# Patient Record
Sex: FEMALE | Employment: OTHER | URBAN - METROPOLITAN AREA
[De-identification: names, ages, dates, MRNs, and addresses within clinical notes are randomized per-mention and may not be internally consistent; named-entity substitution may affect disease eponyms.]

---

## 2018-10-21 ENCOUNTER — OFFICE VISIT (OUTPATIENT)
Dept: FAMILY MEDICINE CLINIC | Facility: CLINIC | Age: 73
End: 2018-10-21
Payer: COMMERCIAL

## 2018-10-21 DIAGNOSIS — H61.22 IMPACTED CERUMEN OF LEFT EAR: Primary | ICD-10-CM

## 2018-10-21 PROCEDURE — 69209 REMOVE IMPACTED EAR WAX UNI: CPT | Performed by: NURSE PRACTITIONER

## 2018-10-21 NOTE — PROGRESS NOTES
Cerumen Removal Procedure  Patient gave verbal consent. Risks and Benefits of removal were discussed with the patient, who agreed to proceed with procedure. Left Ear  Indication TM not visible, local itching, fullness.   Prep Hydrogen Peroxide  Otoscope v

## 2023-08-04 ENCOUNTER — HOSPITAL ENCOUNTER (OUTPATIENT)
Age: 78
Discharge: HOME | End: 2023-08-04
Payer: MEDICARE

## 2023-08-04 ENCOUNTER — APPOINTMENT (OUTPATIENT)
Dept: GENERAL RADIOLOGY | Age: 78
End: 2023-08-04
Attending: EMERGENCY MEDICINE
Payer: MEDICARE

## 2023-08-04 VITALS
SYSTOLIC BLOOD PRESSURE: 106 MMHG | DIASTOLIC BLOOD PRESSURE: 50 MMHG | HEART RATE: 88 BPM | OXYGEN SATURATION: 99 % | RESPIRATION RATE: 20 BRPM | TEMPERATURE: 98 F
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
## 2023-08-04 DIAGNOSIS — M62.830 MUSCLE SPASM OF BACK: Primary | ICD-10-CM

## 2023-08-04 PROBLEM — E55.9 VITAMIN D DEFICIENCY DISEASE: Status: ACTIVE | Noted: 2017-10-19

## 2023-08-04 PROBLEM — R05.3 CHRONIC COUGH: Status: ACTIVE | Noted: 2017-08-01

## 2023-08-04 PROBLEM — F41.1 ANXIETY STATE: Status: ACTIVE | Noted: 2018-10-19

## 2023-08-04 PROBLEM — H25.10 AGE-RELATED NUCLEAR CATARACT: Status: ACTIVE | Noted: 2017-06-16

## 2023-08-04 PROBLEM — E78.5 HYPERLIPIDEMIA: Status: ACTIVE | Noted: 2023-08-04

## 2023-08-04 PROBLEM — N60.99 ATYPICAL DUCTAL HYPERPLASIA, BREAST: Status: ACTIVE | Noted: 2022-01-21

## 2023-08-04 PROCEDURE — 72100 X-RAY EXAM L-S SPINE 2/3 VWS: CPT | Performed by: EMERGENCY MEDICINE

## 2023-08-04 PROCEDURE — 99203 OFFICE O/P NEW LOW 30 MIN: CPT | Performed by: EMERGENCY MEDICINE

## 2023-08-04 RX ORDER — METHOCARBAMOL 500 MG/1
500 TABLET, FILM COATED ORAL 3 TIMES DAILY PRN
Qty: 14 TABLET | Refills: 0 | Status: SHIPPED | OUTPATIENT
Start: 2023-08-04 | End: 2023-08-07

## 2023-08-04 RX ORDER — PREDNISONE 20 MG/1
TABLET ORAL
Qty: 8 TABLET | Refills: 0 | Status: SHIPPED | OUTPATIENT
Start: 2023-08-04 | End: 2023-08-09

## 2023-08-04 NOTE — ED INITIAL ASSESSMENT (HPI)
Pt presents with mechanical fall one week ago. Pt reports walking into a wall in the middle of the night, fell backwards onto buttocks/lower back, onto a piece of wooden trim at the bottom of the bed. Pain radiates down legs, no numbness or tingling in legs. No head injury, no LOC. Pt took Advil early am today.

## 2023-08-04 NOTE — DISCHARGE INSTRUCTIONS
There is no acute fracture/break on the x-ray. Always in the day with a cool compress/ice pack. Prednisone steroid sent to the pharmacy to help out with inflammation. Depending on when you get the medication you may want to start tomorrow morning. It is only once daily. For the first 3 days you will take 40 mg, and the last 2 days you will take 20 mg. Robaxin muscle relaxer was sent to the pharmacy. Start out with 1 tablet. He can increase to 2 tablets every 8 hours as needed for muscle spasm/pain. Do not exceed 6 tablets in 24 hours. Strict fall precautions while being on this medication. Make sure that you are changing positions slowly, and use the bathroom before you go to bed. Call your primary care for follow-up if you are not better within the next week. You may need physical therapy. Go to the ER for any new or worsening symptoms. Avoid taking Advil/Motrin/ibuprofen with prednisone. Wait at least 4 to 6 hours in between. It can cause GI upset and ulcers over time.

## 2023-08-07 RX ORDER — METHOCARBAMOL 500 MG/1
500 TABLET, FILM COATED ORAL 3 TIMES DAILY
Qty: 15 TABLET | Refills: 0 | Status: SHIPPED | OUTPATIENT
Start: 2023-08-07 | End: 2023-08-12

## 2024-09-21 ENCOUNTER — HOSPITAL ENCOUNTER (EMERGENCY)
Facility: HOSPITAL | Age: 79
Discharge: HOME | End: 2024-09-21
Attending: EMERGENCY MEDICINE
Payer: MEDICARE

## 2024-09-21 ENCOUNTER — APPOINTMENT (OUTPATIENT)
Dept: CT IMAGING | Facility: HOSPITAL | Age: 79
End: 2024-09-21
Payer: MEDICARE

## 2024-09-21 VITALS
BODY MASS INDEX: 21.34 KG/M2 | WEIGHT: 125 LBS | RESPIRATION RATE: 17 BRPM | HEART RATE: 62 BPM | HEIGHT: 64 IN | SYSTOLIC BLOOD PRESSURE: 142 MMHG | TEMPERATURE: 98 F | DIASTOLIC BLOOD PRESSURE: 86 MMHG | OXYGEN SATURATION: 99 %

## 2024-09-21 DIAGNOSIS — R55 SYNCOPE AND COLLAPSE: Primary | ICD-10-CM

## 2024-09-21 DIAGNOSIS — S09.90XA INJURY OF HEAD, INITIAL ENCOUNTER: ICD-10-CM

## 2024-09-21 LAB
ALBUMIN SERPL-MCNC: 4.8 G/DL (ref 3.2–4.8)
ALBUMIN/GLOB SERPL: 1.9 {RATIO} (ref 1–2)
ALP LIVER SERPL-CCNC: 61 U/L
ALT SERPL-CCNC: 18 U/L
ANION GAP SERPL CALC-SCNC: 7 MMOL/L (ref 0–18)
AST SERPL-CCNC: 30 U/L (ref ?–34)
BASOPHILS # BLD AUTO: 0.03 X10(3) UL (ref 0–0.2)
BASOPHILS NFR BLD AUTO: 0.4 %
BILIRUB SERPL-MCNC: 0.5 MG/DL (ref 0.2–1.1)
BUN BLD-MCNC: 17 MG/DL (ref 9–23)
BUN/CREAT SERPL: 26.6 (ref 10–20)
CALCIUM BLD-MCNC: 9.5 MG/DL (ref 8.7–10.4)
CHLORIDE SERPL-SCNC: 103 MMOL/L (ref 98–112)
CO2 SERPL-SCNC: 27 MMOL/L (ref 21–32)
CREAT BLD-MCNC: 0.64 MG/DL
DEPRECATED RDW RBC AUTO: 41.4 FL (ref 35.1–46.3)
EGFRCR SERPLBLD CKD-EPI 2021: 90 ML/MIN/1.73M2 (ref 60–?)
EOSINOPHIL # BLD AUTO: 0.11 X10(3) UL (ref 0–0.7)
EOSINOPHIL NFR BLD AUTO: 1.5 %
ERYTHROCYTE [DISTWIDTH] IN BLOOD BY AUTOMATED COUNT: 13.5 % (ref 11–15)
GLOBULIN PLAS-MCNC: 2.5 G/DL (ref 2–3.5)
GLUCOSE BLD-MCNC: 112 MG/DL (ref 70–99)
HCT VFR BLD AUTO: 36 %
HGB BLD-MCNC: 12.4 G/DL
IMM GRANULOCYTES # BLD AUTO: 0.02 X10(3) UL (ref 0–1)
IMM GRANULOCYTES NFR BLD: 0.3 %
LYMPHOCYTES # BLD AUTO: 0.98 X10(3) UL (ref 1–4)
LYMPHOCYTES NFR BLD AUTO: 13.1 %
MCH RBC QN AUTO: 29 PG (ref 26–34)
MCHC RBC AUTO-ENTMCNC: 34.4 G/DL (ref 31–37)
MCV RBC AUTO: 84.1 FL
MONOCYTES # BLD AUTO: 0.66 X10(3) UL (ref 0.1–1)
MONOCYTES NFR BLD AUTO: 8.8 %
NEUTROPHILS # BLD AUTO: 5.68 X10 (3) UL (ref 1.5–7.7)
NEUTROPHILS # BLD AUTO: 5.68 X10(3) UL (ref 1.5–7.7)
NEUTROPHILS NFR BLD AUTO: 75.9 %
OSMOLALITY SERPL CALC.SUM OF ELEC: 286 MOSM/KG (ref 275–295)
PLATELET # BLD AUTO: 337 10(3)UL (ref 150–450)
POTASSIUM SERPL-SCNC: 4.1 MMOL/L (ref 3.5–5.1)
PROT SERPL-MCNC: 7.3 G/DL (ref 5.7–8.2)
RBC # BLD AUTO: 4.28 X10(6)UL
SODIUM SERPL-SCNC: 137 MMOL/L (ref 136–145)
TROPONIN I SERPL HS-MCNC: 4 NG/L
WBC # BLD AUTO: 7.5 X10(3) UL (ref 4–11)

## 2024-09-21 PROCEDURE — 70450 CT HEAD/BRAIN W/O DYE: CPT | Performed by: EMERGENCY MEDICINE

## 2024-09-21 PROCEDURE — 85025 COMPLETE CBC W/AUTO DIFF WBC: CPT

## 2024-09-21 PROCEDURE — 36415 COLL VENOUS BLD VENIPUNCTURE: CPT

## 2024-09-21 PROCEDURE — 99285 EMERGENCY DEPT VISIT HI MDM: CPT

## 2024-09-21 PROCEDURE — 85025 COMPLETE CBC W/AUTO DIFF WBC: CPT | Performed by: EMERGENCY MEDICINE

## 2024-09-21 PROCEDURE — 93005 ELECTROCARDIOGRAM TRACING: CPT

## 2024-09-21 PROCEDURE — 99284 EMERGENCY DEPT VISIT MOD MDM: CPT

## 2024-09-21 PROCEDURE — 84484 ASSAY OF TROPONIN QUANT: CPT

## 2024-09-21 PROCEDURE — 84484 ASSAY OF TROPONIN QUANT: CPT | Performed by: EMERGENCY MEDICINE

## 2024-09-21 PROCEDURE — 80053 COMPREHEN METABOLIC PANEL: CPT | Performed by: EMERGENCY MEDICINE

## 2024-09-21 PROCEDURE — 93010 ELECTROCARDIOGRAM REPORT: CPT

## 2024-09-21 PROCEDURE — 80053 COMPREHEN METABOLIC PANEL: CPT

## 2024-09-21 NOTE — ED INITIAL ASSESSMENT (HPI)
Pt presents she was standing in the kitchen and fell. Pt reports \"I might have passed out. I'm not really sure.\" Pt reports falling backwards and hit head on ceramic tile floor. +LOC. Pt denies blood thinner use.

## 2024-09-21 NOTE — ED QUICK NOTES
Pt to ED with daughter. PT reporting at approx 1330, \"blacked out\" for unknown amount of time in kitchen, unwitnessed fall, does not know if she felt dizzy before falling, + hit head, no anticoagulants.

## 2024-09-22 LAB
ATRIAL RATE: 67 BPM
P AXIS: 89 DEGREES
P-R INTERVAL: 144 MS
Q-T INTERVAL: 384 MS
QRS DURATION: 80 MS
QTC CALCULATION (BEZET): 405 MS
R AXIS: 75 DEGREES
T AXIS: 49 DEGREES
VENTRICULAR RATE: 67 BPM

## 2024-09-22 NOTE — ED PROVIDER NOTES
Patient Seen in: [facility] Emergency Department    History     Chief Complaint   Patient presents with    Trauma       HPI    The patient presents to the ED after passing out in her kitchen and waking up on the floor.  She called her  after she woke up to find her on the floor.  She states that she hit the back of her head on the floor and has pain in this area.  Denies palpitations or chest pain prior to passing out.  She denies history of passing out.  Denies any anticoagulant use.  Slightly confused but otherwise well-appearing per daughter who arrived to minutes after the incident.    History reviewed.   Past Medical History:    Hyperlipidemia       History reviewed. History reviewed. No pertinent surgical history.      Medications :  (Not in a hospital admission)       No family history on file.    Smoking Status:   Social History     Socioeconomic History    Marital status:    Tobacco Use    Smoking status: Never    Smokeless tobacco: Never   Vaping Use    Vaping status: Never Used   Substance and Sexual Activity    Alcohol use: Yes    Drug use: Not Currently       Constitutional and vital signs reviewed.      Social History and Family History elements reviewed from today, pertinent positives to the presenting problem noted.    Physical Exam     ED Triage Vitals [09/21/24 1540]   /74   Pulse 67   Resp 19   Temp 98.3 °F (36.8 °C)   Temp src Oral   SpO2 98 %   O2 Device None (Room air)       All measures to prevent infection transmission during my interaction with the patient were taken. Handwashing was performed prior to and after the exam.  Stethoscope and any equipment used during my examination was cleaned with super sani-cloth germicidal wipes following the exam.     Physical Exam  Vitals and nursing note reviewed.   Constitutional:       General: She is not in acute distress.     Appearance: Normal appearance. She is well-developed.   HENT:      Head: Normocephalic.       Comments: Contusion of the left occipital scalp  Eyes:      General:         Right eye: No discharge.         Left eye: No discharge.      Conjunctiva/sclera: Conjunctivae normal.   Neck:      Trachea: No tracheal deviation.   Cardiovascular:      Rate and Rhythm: Normal rate.   Pulmonary:      Effort: Pulmonary effort is normal. No respiratory distress.   Abdominal:      General: There is no distension.      Palpations: Abdomen is soft.   Musculoskeletal:         General: No deformity.      Cervical back: Neck supple. No tenderness.   Skin:     General: Skin is warm and dry.   Neurological:      General: No focal deficit present.      Mental Status: She is alert and oriented to person, place, and time.   Psychiatric:         Mood and Affect: Mood normal.         Behavior: Behavior normal.         ED Course        Labs Reviewed   COMP METABOLIC PANEL (14) - Abnormal; Notable for the following components:       Result Value    Glucose 112 (*)     BUN/CREA Ratio 26.6 (*)     All other components within normal limits   CBC WITH DIFFERENTIAL WITH PLATELET - Abnormal; Notable for the following components:    Lymphocyte Absolute 0.98 (*)     All other components within normal limits   TROPONIN I HIGH SENSITIVITY - Normal   RAINBOW DRAW LAVENDER   RAINBOW DRAW LIGHT GREEN   RAINBOW DRAW BLUE     EKG    Rate, intervals and axes as noted on EKG Report.  Rate: Normal, 67 bpm  Rhythm: Sinus Rhythm  Reading: Normal intervals, nonspecific ST and T wave abnormality, abnormal EKG           As Interpreted by me    Imaging Results Available and Reviewed while in ED: CT BRAIN OR HEAD (CPT=70450)    Result Date: 9/21/2024  CONCLUSION:  1. No acute intracranial process by noncontrast CT technique. 2. Chronic-appearing lacunar infarcts in the right greater than left basal nuclei. 3. Mild scattered intracranial atherosclerosis. 4. Lesser incidental findings as above.   Dictated by (CST): Sathya Ramirez MD on 9/21/2024 at 4:53 PM      Finalized by (CST): Sathya Ramirez MD on 9/21/2024 at 4:55 PM         ED Medications Administered: Medications - No data to display      MDM     Vitals:    09/21/24 1615 09/21/24 1645 09/21/24 1700 09/21/24 1715   BP: 158/90 152/71 152/84 142/86   Pulse: 80 66 64 62   Resp: 19 19 12 17   Temp:       TempSrc:       SpO2: 96% 98% 98% 99%   Weight:       Height:         *I personally reviewed and interpreted all ED vitals.    Pulse Ox: 99%, Room air, Normal     Monitor Interpretation:   normal sinus rhythm as interpreted by me.  The cardiac monitor was ordered to monitor heart rate and monitor for arrhythmia.    Differential Diagnosis/ Diagnostic Considerations: Vasovagal syncope, head injury, ICH, skull fracture, seizure, other    Complicating Factors: The patient already has does not have any pertinent problems on file. to contribute to the complexity of this ED evaluation.    Medical Decision Making  The patient presents to the ED after a likely syncopal event in her house.  She reports losing consciousness in the kitchen.  Awake and talking per daughter who arrived 10 minutes after the incident.  Mild confusion at that time but patient well-appearing in the ED without focal neurodeficits or other symptoms.  She is alert oriented nondistressed.  Isolated pain in her head.  CT brain without intracranial injury.  No cardiac arrhythmia on monitor and normal cardiac testing.  Patient without concerning cause for her likely syncopal event.  Low risk based on Perry syncope rule.  Patient would like to go home and I feel stable to do so given outpatient follow-up.    Problems Addressed:  Injury of head, initial encounter: acute illness or injury that poses a threat to life or bodily functions  Syncope and collapse: acute illness or injury that poses a threat to life or bodily functions    Amount and/or Complexity of Data Reviewed  Labs: ordered.  Radiology: ordered and independent interpretation performed.  Decision-making details documented in ED Course.     Details: I personally reviewed the patient's CT brain images and noted no ICH  ECG/medicine tests: ordered and independent interpretation performed. Decision-making details documented in ED Course.        Condition upon leaving the department: Stable    Disposition and Plan     Clinical Impression:  1. Syncope and collapse    2. Injury of head, initial encounter        Disposition:  Discharge    Follow-up:  Jacquie Ryan MD  [facility] [address] [facility] [address]  [phone]    Schedule an appointment as soon as possible for a visit in 3 day(s)        Medications Prescribed:  There are no discharge medications for this patient.

## 2024-12-10 ENCOUNTER — APPOINTMENT (OUTPATIENT)
Dept: GENERAL RADIOLOGY | Facility: HOSPITAL | Age: 79
End: 2024-12-10
Payer: MEDICARE

## 2024-12-10 ENCOUNTER — HOSPITAL ENCOUNTER (OUTPATIENT)
Age: 79
Discharge: ANOTHER HOSPITAL | End: 2024-12-10
Payer: MEDICARE

## 2024-12-10 ENCOUNTER — HOSPITAL ENCOUNTER (EMERGENCY)
Facility: HOSPITAL | Age: 79
Discharge: HOME | End: 2024-12-10
Attending: EMERGENCY MEDICINE
Payer: MEDICARE

## 2024-12-10 VITALS
OXYGEN SATURATION: 98 % | WEIGHT: 124 LBS | TEMPERATURE: 98 F | HEART RATE: 73 BPM | RESPIRATION RATE: 18 BRPM | HEIGHT: 64 IN | BODY MASS INDEX: 21.17 KG/M2 | DIASTOLIC BLOOD PRESSURE: 65 MMHG | SYSTOLIC BLOOD PRESSURE: 136 MMHG

## 2024-12-10 VITALS
SYSTOLIC BLOOD PRESSURE: 145 MMHG | HEART RATE: 80 BPM | DIASTOLIC BLOOD PRESSURE: 57 MMHG | OXYGEN SATURATION: 97 % | TEMPERATURE: 99 F | RESPIRATION RATE: 20 BRPM

## 2024-12-10 DIAGNOSIS — S09.90XA INJURY OF HEAD, INITIAL ENCOUNTER: Primary | ICD-10-CM

## 2024-12-10 DIAGNOSIS — S82.025A CLOSED NONDISPLACED LONGITUDINAL FRACTURE OF LEFT PATELLA, INITIAL ENCOUNTER: Primary | ICD-10-CM

## 2024-12-10 DIAGNOSIS — S89.92XA INJURY OF LEFT KNEE, INITIAL ENCOUNTER: ICD-10-CM

## 2024-12-10 PROCEDURE — 73560 X-RAY EXAM OF KNEE 1 OR 2: CPT | Performed by: EMERGENCY MEDICINE

## 2024-12-10 PROCEDURE — 99215 OFFICE O/P EST HI 40 MIN: CPT | Performed by: NURSE PRACTITIONER

## 2024-12-10 PROCEDURE — 99284 EMERGENCY DEPT VISIT MOD MDM: CPT

## 2024-12-10 RX ORDER — ASPIRIN 81 MG/1
81 TABLET ORAL DAILY
COMMUNITY

## 2024-12-10 RX ORDER — HYDROCODONE BITARTRATE AND ACETAMINOPHEN 5; 325 MG/1; MG/1
1 TABLET ORAL EVERY 6 HOURS PRN
Qty: 20 TABLET | Refills: 0 | Status: SHIPPED | OUTPATIENT
Start: 2024-12-10

## 2024-12-10 RX ORDER — ALENDRONATE SODIUM 70 MG/1
70 TABLET ORAL WEEKLY
COMMUNITY

## 2024-12-10 RX ORDER — ESCITALOPRAM OXALATE 20 MG/1
0.5 TABLET ORAL 2 TIMES DAILY
COMMUNITY
Start: 2024-10-15

## 2024-12-10 RX ORDER — ATORVASTATIN CALCIUM 80 MG/1
80 TABLET, FILM COATED ORAL DAILY
COMMUNITY
Start: 2024-09-06

## 2024-12-11 NOTE — ED QUICK NOTES
Patient safe to DC home per MD. Able to dress self. DC teaching done, instructions reviewed with patient and family,  including when and how to follow up with healthcare providers and when to seek emergency care. The patient verbalizes understanding. Patient wheeled to exit.

## 2024-12-11 NOTE — ED PROVIDER NOTES
Patient Seen in: [facility]      History     Chief Complaint   Patient presents with    Leg or Foot Injury     Fall knees - Entered by patient     Stated Complaint: Leg or Foot Injury - Fall knees    Subjective:   79-year-old female with hyperlipidemia, vitamin D deficiency and anxiety presents with daughter and sister with complaints of falling at around 2 PM today.  Patient communicates that she was walking through a parking lot and slipped, causing her to fall forward.  Patient communicates that now she is unable to walk due to left knee pain.  Patient also communicates hitting the right side of her head on a curb when falling.  Patient denies loss of consciousness, nausea or vomiting.  Patient communicates that she braced her fall with her hands, and is now feeling some discomfort in her upper chest with upper arm range of motion.                 Objective:     Past Medical History:    Hyperlipidemia              History reviewed. No pertinent surgical history.             Social History     Socioeconomic History    Marital status:    Tobacco Use    Smoking status: Never    Smokeless tobacco: Never   Vaping Use    Vaping status: Never Used   Substance and Sexual Activity    Alcohol use: Yes    Drug use: Not Currently     Social Drivers of Health     Financial Resource Strain: Low Risk  (9/19/2024)    Received from [facility]    Overall Financial Resource Strain (CARDIA)     Difficulty of Paying Living Expenses: Not hard at all   Food Insecurity: Low Risk  (11/12/2024)    Received from [facility]    Food Insecurity     Have there been times that your food ran out, and you didn't have money to get more?: No     Are there times that you worry that this might happen?: No   Transportation Needs: Low Risk  (11/12/2024)    Received from [facility]    Transportation Needs     Do you have trouble getting transportation to medical  appointments?: No    [facility]    Social Connections   Housing Stability: Low Risk  (11/12/2024)    Received from [facility]    Housing Stability     Are you worried that your electric, gas, oil, or water might be shut off?: No     Are you concerned about having a safe and reliable place to live?: No              Review of Systems    Positive for stated complaint: Leg or Foot Injury - Fall knees  Other systems are as noted in HPI.  Constitutional and vital signs reviewed.      All other systems reviewed and negative except as noted above.    Physical Exam     ED Triage Vitals [12/10/24 1758]   /57   Pulse 80   Resp 20   Temp 98.7 °F (37.1 °C)   Temp src Oral   SpO2 97 %   O2 Device None (Room air)       Current Vitals:   Vital Signs  BP: 145/57  Pulse: 80  Resp: 20  Temp: 98.7 °F (37.1 °C)  Temp src: Oral    Oxygen Therapy  SpO2: 97 %  O2 Device: None (Room air)        Physical Exam  VS: Vital signs reviewed. 02 saturation within normal limits for this patient.    General: Patient is awake and alert, oriented to person, place and time. Pt appears non-toxic.     HEENT: Head is normocephalic. Pupils reactive bilaterally. Nonicteric sclera, no conjunctival injection. No facial droop or slurred speech. No oral lesions or pallor. Mucous membranes moist.      Right Ear: No hemotympanum.      Left Ear: No hemotympanum.      Nose: No rhinorrhea.     Ecchymosis present to right aspect of head.  Tender to palpation.  No bony step-off.    Neck: Supple. Normal ROM.    Lungs: Good inspiratory effort.  No accessory muscle use or tachypnea.    Back: Normal inspection, no tenderness.     Extremities: Capillary refill noted.     Skin: Warm, dry and normal in color.     Psychiatric: Normal affect, judgement normal, insight normal.     CNS: Moves all 4 extremities. Interacts appropriately. No gait abnormality. Memory normal.        ED Course    Labs Reviewed - No data to display    MDM   Medical Decision Making  Patient communicates not feeling well since fall.    I discussed with patient and patient's family the need for further evaluation/management in the emergency department at this time due to limited diagnostics available at this facility.    Patient's daughter communicates that they will be taking patient to [facility] ER.    Problems Addressed:  Injury of head, initial encounter: acute illness or injury  Injury of left knee, initial encounter: acute illness or injury        Disposition and Plan     Clinical Impression:  1. Injury of head, initial encounter    2. Injury of left knee, initial encounter         Disposition:  Ic to ed  12/10/2024  6:33 pm    Follow-up:  No follow-up provider specified.        Medications Prescribed:  Discharge Medication List as of 12/10/2024  6:34 PM              Supplementary Documentation:

## 2024-12-11 NOTE — ED INITIAL ASSESSMENT (HPI)
Pt states today at 2pm was walking through the parking lot and slipped causing her to fall forward pt states left knee took brunt of damage, braced her fall with her hand but also hit right side of head. Pt denies LOC.

## 2024-12-11 NOTE — ED INITIAL ASSESSMENT (HPI)
Pt presents to ed with c/o fall. Pt states she tripped in a parking lot and hit her head on the cement. Pt reports left knee pain, and is unable to bear weight on the left side. Pt aox4, speaking in full sentences.     No LOC. + thinners

## 2024-12-11 NOTE — ED PROVIDER NOTES
Patient Seen in: [facility] Emergency Department    History     Chief Complaint   Patient presents with    Fall       HPI    Patient presents to the ED after a fall.  She states that she tripped in a parking lot and fell and hit her left knee and also slightly hit her head.  Takes aspirin but no other anticoagulants.  Denies headache at this time and no vomiting or LOC.  She complains of severe pain in her left knee and cannot bear weight.    History reviewed.   Past Medical History:    Hyperlipidemia       History reviewed. History reviewed. No pertinent surgical history.      Medications :  Prescriptions Prior to Admission[1]     No family history on file.    Smoking Status:   Social History     Socioeconomic History    Marital status:    Tobacco Use    Smoking status: Never    Smokeless tobacco: Never   Vaping Use    Vaping status: Never Used   Substance and Sexual Activity    Alcohol use: Yes    Drug use: Not Currently       Constitutional and vital signs reviewed.      Social History and Family History elements reviewed from today, pertinent positives to the presenting problem noted.    Physical Exam     ED Triage Vitals [12/10/24 1904]   /65   Pulse 73   Resp 18   Temp 97.9 °F (36.6 °C)   Temp src Temporal   SpO2 98 %   O2 Device None (Room air)       All measures to prevent infection transmission during my interaction with the patient were taken. Handwashing was performed prior to and after the exam.  Stethoscope and any equipment used during my examination was cleaned with super sani-cloth germicidal wipes following the exam.     Physical Exam  Vitals and nursing note reviewed.   Constitutional:       General: She is not in acute distress.     Appearance: She is well-developed.   HENT:      Head: Normocephalic.      Comments: Small contusion to the right lateral eye.  Eyes:      General:         Right eye: No discharge.         Left eye: No discharge.      Conjunctiva/sclera:  Conjunctivae normal.   Neck:      Trachea: No tracheal deviation.   Cardiovascular:      Rate and Rhythm: Normal rate.   Pulmonary:      Effort: Pulmonary effort is normal. No respiratory distress.      Breath sounds: No stridor.   Abdominal:      General: There is no distension.      Palpations: Abdomen is soft.   Musculoskeletal:         General: Swelling and tenderness present. No deformity.      Cervical back: No tenderness.      Comments: Tenderness and swelling to the left anterior knee.  No obvious bony deformity.   Skin:     General: Skin is warm and dry.   Neurological:      Mental Status: She is alert and oriented to person, place, and time.   Psychiatric:         Mood and Affect: Mood normal.         Behavior: Behavior normal.         ED Course      Labs Reviewed - No data to display    As Interpreted by me    Imaging Results Available and Reviewed while in ED: XR KNEE (1 OR 2 VIEWS), LEFT (CPT=73560)    Result Date: 12/10/2024  CONCLUSION:  1. Nondisplaced fractures of the left patella without destruction of the superior and inferior patellar poles.  2. Suspected large left lipohemarthrosis.    Dictated by (CST): Mumtaz Lockett MD on 12/10/2024 at 8:21 PM     Finalized by (CST): Mumtaz Lockett MD on 12/10/2024 at 8:22 PM         ED Medications Administered: Medications - No data to display      MDM     Vitals:    12/10/24 1904   BP: 136/65   Pulse: 73   Resp: 18   Temp: 97.9 °F (36.6 °C)   TempSrc: Temporal   SpO2: 98%   Weight: 56.2 kg   Height: 162.6 cm (5' 4\")     *I personally reviewed and interpreted all ED vitals.    Pulse Ox: 98%, Room air, Normal     Differential Diagnosis/ Diagnostic Considerations: Head injury, knee contusion, knee fracture, other    Complicating Factors: The patient already has does not have any pertinent problems on file. to contribute to the complexity of this ED evaluation.    Medical Decision Making  Patient presents to the ED with a left knee injury after falling in the  parking lot.  Nondisplaced longitudinal patella fracture noted on x-ray imaging.  Patient declined CT head imaging despite recommendations for a CT.  Knee immobilizer placed in the ED and patient comfortable going home with a walker and outpatient orthopedic follow-up.    Problems Addressed:  Closed nondisplaced longitudinal fracture of left patella, initial encounter: acute illness or injury    Amount and/or Complexity of Data Reviewed  Radiology: ordered and independent interpretation performed. Decision-making details documented in ED Course.     Details: I personally reviewed the patient's left knee x-ray images and noted a patellar fracture    Risk  Prescription drug management.        Condition upon leaving the department: Stable    Disposition and Plan     Clinical Impression:  1. Closed nondisplaced longitudinal fracture of left patella, initial encounter        Disposition:  Discharge    Follow-up:  Vale Pham DO  [address] [address]  [phone]    Schedule an appointment as soon as possible for a visit in 2 day(s)        Medications Prescribed:  Discharge Medication List as of 12/10/2024  9:12 PM        START taking these medications    Details   HYDROcodone-acetaminophen 5-325 MG Oral Tab Take 1 tablet by mouth every 6 (six) hours as needed for Pain., Normal, Disp-20 tablet, R-0                              [1] (Not in a hospital admission)

## 2025-05-29 ENCOUNTER — APPOINTMENT (OUTPATIENT)
Dept: GENERAL RADIOLOGY | Age: 80
End: 2025-05-29
Attending: NURSE PRACTITIONER
Payer: MEDICARE

## 2025-05-29 ENCOUNTER — HOSPITAL ENCOUNTER (OUTPATIENT)
Age: 80
Discharge: HOME | End: 2025-05-29
Payer: MEDICARE

## 2025-05-29 VITALS
SYSTOLIC BLOOD PRESSURE: 123 MMHG | DIASTOLIC BLOOD PRESSURE: 55 MMHG | TEMPERATURE: 99 F | HEART RATE: 70 BPM | RESPIRATION RATE: 20 BRPM | OXYGEN SATURATION: 99 %

## 2025-05-29 DIAGNOSIS — S62.655A CLOSED NONDISPLACED FRACTURE OF MIDDLE PHALANX OF LEFT RING FINGER, INITIAL ENCOUNTER: Primary | ICD-10-CM

## 2025-05-29 DIAGNOSIS — S69.92XA INJURY OF FINGER OF LEFT HAND, INITIAL ENCOUNTER: ICD-10-CM

## 2025-05-29 PROCEDURE — A4570 SPLINT: HCPCS | Performed by: NURSE PRACTITIONER

## 2025-05-29 PROCEDURE — 99213 OFFICE O/P EST LOW 20 MIN: CPT | Performed by: NURSE PRACTITIONER

## 2025-05-29 PROCEDURE — 73140 X-RAY EXAM OF FINGER(S): CPT | Performed by: NURSE PRACTITIONER

## 2025-05-29 NOTE — ED PROVIDER NOTES
History     Chief Complaint   Patient presents with    Finger Injury       Subjective:   HPI    Talita Barriga, 79 year old female with notable medical history of low vitamin D who presents with finger injury. Patient reports jamming her Left 4th digit with a chair and has had pain / swelling since. She reports symptoms have persisted / worsened d/t her being unable to remove her two rings on the digit. Denies other injuries or complaints.      Problem List[1]   Objective:   Past Medical History:    Hyperlipidemia              History reviewed. No pertinent surgical history.             No pertinent social history.            Medications Ordered Prior to Encounter[2]      Constitutional and vital signs reviewed.      All other systems reviewed and negative except as noted above.    I have reviewed the family history, social history, allergies, and outpatient medications.     History reviewed from EMR: Encounters, problem list, allergies, medications      Physical Exam     ED Triage Vitals [05/29/25 1416]   /55   Pulse 70   Resp 20   Temp 98.9 °F (37.2 °C)   Temp src Oral   SpO2 99 %   O2 Device None (Room air)       Current:/55   Pulse 70   Temp 98.9 °F (37.2 °C) (Oral)   Resp 20   SpO2 99%       Physical Exam  Vitals and nursing note reviewed.   Constitutional:       General: She is not in acute distress.     Appearance: Normal appearance. She is normal weight. She is not ill-appearing or toxic-appearing.   HENT:      Head: Normocephalic and atraumatic.      Right Ear: External ear normal.      Left Ear: External ear normal.      Nose: Nose normal.      Mouth/Throat:      Mouth: Mucous membranes are moist.   Eyes:      Extraocular Movements: Extraocular movements intact.      Conjunctiva/sclera: Conjunctivae normal.      Pupils: Pupils are equal, round, and reactive to light.   Cardiovascular:      Rate and Rhythm: Normal rate.      Pulses: Normal pulses.   Pulmonary:      Effort: Pulmonary  effort is normal. No respiratory distress.   Musculoskeletal:         General: No swelling or signs of injury. Normal range of motion.      Left hand: Swelling and tenderness present.      Cervical back: Normal range of motion and neck supple.      Comments: Notable swelling / bruising to Left 4th digit near PIP. Two metal rings on proximal phalanx.   Skin:     General: Skin is warm and dry.      Capillary Refill: Capillary refill takes less than 2 seconds.      Coloration: Skin is not jaundiced.   Neurological:      General: No focal deficit present.      Mental Status: She is alert and oriented to person, place, and time. Mental status is at baseline.   Psychiatric:         Mood and Affect: Mood normal.         Behavior: Behavior normal.         Thought Content: Thought content normal.         Judgment: Judgment normal.            ED Course     Labs Reviewed - No data to display  XR FINGER(S) (MIN 2 VIEWS), LEFT 4TH (CPT=73140)   Final Result   PROCEDURE: XR FINGER(S) (MIN 2 VIEWS), LEFT 4TH (CPT=73140)       COMPARISON: None.       INDICATIONS: Pain and swelling to left 4th PIP joint. Injury 1 day ago.       TECHNIQUE: 3 views were obtained.         FINDINGS:    BONES: Severe narrowing of the middle finger DIP joint.  Moderate    narrowing of the PIP joint.  There is osteopenia.  Joint space is    otherwise maintained.  Subtle nondisplaced intra-articular fracture    involving the volar base of the middle finger middle    phalanx.   SOFT TISSUES: Severe soft tissue swelling at the PIP joint.   EFFUSION: None visible.    OTHER: Negative.                    =====   CONCLUSION:        Subtle nondisplaced intra-articular fracture of the volar base of the    middle finger middle phalanx.       Severe soft tissue swelling.             Dictated by (CST): Monroe Mosher MD on 5/29/2025 at 3:29 PM        Finalized by (CST): Monroe Mosher MD on 5/29/2025 at 3:30 PM                   Vitals:    05/29/25 1416   BP: 123/55    Pulse: 70   Resp: 20   Temp: 98.9 °F (37.2 °C)   TempSrc: Oral   SpO2: 99%            [facility]        Talita Barriga, 79 year old female with medical history as noted above who presents with finger injury   - Patient in NAD, VSS   - jammed finger vs fracture vs other   - Discussed need to remove rings d/t swelling and c/f distal circulation. Patient in agreement.   - Electric ring cutter used with guard to successfully remove both metal rings w/o complication or further injury. Both rings given to patient.   - Xray ordered       ** See ED course below for additional information on care provided / interventions / notable events throughout patient's encounter.      ED Course as of 05/29/25 1547  ------------------------------------------------------------  Time: 05/29 1528  Comment: Self interpretation of radiographic imaging: no obvious acute process.   Awaiting radiologist interpretation.    ------------------------------------------------------------  Time: 05/29 1546  Comment: Radiology noting small fracture to volar aspect middle phalanx  Finger splint applied with patient reporting improvement in pain  Supportive care discussed  Follow up with primary care provider as needed        ** I have independently reviewed the radiology images, clinical lab results, and ECG tracings as described above (if applicable)    ** Concerning co-morbidities possibly affecting complaint / care: n/a        Medical Decision Making  Amount and/or Complexity of Data Reviewed  Radiology: ordered and independent interpretation performed. Decision-making details documented in ED Course.    Risk  OTC drugs.        Disposition and Plan     Disposition:  Discharge  5/29/2025  3:46 pm    Clinical Impression:  1. Closed nondisplaced fracture of middle phalanx of left ring finger, initial encounter    2. Injury of finger of left hand, initial encounter            Home care instructions:       - Wear finger splint as much as possible to promote  healing and lessen pain   - Splint should be worn for approx 4-6 weeks (can remove to shower)   - Tylenol for pain as needed      Follow-up:  Jacquie Ryan MD  [address]  [facility] [address]  [phone]      As needed          Medications Prescribed:  Current Discharge Medication List            Dusty Cardona, DNP, APRN, AGACNP-BC, FNP-C, CNL  Adult-Gerontology Acute Care & Family Nurse Practitioner  [facility]      The above patient (and/or guardian) was made aware that an appropriate evaluation has been performed, and that no additional testing is required at this time. In my medical judgment, there is currently no evidence of an immediate life-threatening or surgical condition, therefore discharge is indicated at this time. The patient (and/or guardian) was advised that a small risk still exists that a serious condition could develop. The patient was instructed to arrange close follow-up with their primary care provider (or the referral provider given today). The patient received written and verbal instructions regarding their condition / concerns, demonstrated understanding, and is agreement with the outpatient treatment plan.              [1]   Patient Active Problem List  Diagnosis    Age-related nuclear cataract    Anxiety state    Atypical ductal hyperplasia, breast    Chronic cough    Vitamin D deficiency disease    Hyperlipidemia   [2]   No current facility-administered medications on file prior to encounter.     Current Outpatient Medications on File Prior to Encounter   Medication Sig Dispense Refill    atorvastatin 80 MG Oral Tab Take 1 tablet (80 mg total) by mouth daily.      escitalopram 20 MG Oral Tab Take 0.5 tablets (10 mg total) by mouth 2 (two) times daily.      aspirin 81 MG Oral Tab EC Take 1 tablet (81 mg total) by mouth daily.      alendronate 70 MG Oral Tab Take 1 tablet (70 mg total) by mouth once a week.      HYDROcodone-acetaminophen 5-325 MG Oral Tab  Take 1 tablet by mouth every 6 (six) hours as needed for Pain. 20 tablet 0

## 2025-05-29 NOTE — DISCHARGE INSTRUCTIONS
- Wear finger splint as much as possible to promote healing and lessen pain   - Splint should be worn for approx 4-6 weeks (can remove to shower)   - Tylenol for pain as needed

## 2025-05-29 NOTE — ED INITIAL ASSESSMENT (HPI)
Pt came in due to swelling, pain, and bruising  in left ring finger and has two rings that will not come off due to swelling. Pt stated she accidentally jammed her left ring finger with a chair. Pt has easy non labored respirations.